# Patient Record
Sex: FEMALE | Race: WHITE
[De-identification: names, ages, dates, MRNs, and addresses within clinical notes are randomized per-mention and may not be internally consistent; named-entity substitution may affect disease eponyms.]

---

## 2017-12-15 ENCOUNTER — HOSPITAL ENCOUNTER (OUTPATIENT)
Dept: HOSPITAL 61 - KCIC | Age: 57
Discharge: HOME | End: 2017-12-15
Attending: ORTHOPAEDIC SURGERY
Payer: COMMERCIAL

## 2017-12-15 DIAGNOSIS — M75.101: Primary | ICD-10-CM

## 2017-12-15 PROCEDURE — A9585 GADOBUTROL INJECTION: HCPCS

## 2017-12-15 PROCEDURE — 73040 CONTRAST X-RAY OF SHOULDER: CPT

## 2017-12-15 PROCEDURE — 73222 MRI JOINT UPR EXTREM W/DYE: CPT

## 2017-12-15 NOTE — [facility]
PROCEDURE: 

Right shoulder injection using fluoroscopic guidance, prior to MR.

 

HISTORY: 

Shoulder pain. Prior rotator cuff repair.

 

TECHNIQUE: 

The procedure was explained to the patient as were potential risks, 

including among others infection, bleeding or allergic reaction. All 

questions were answered. Informed written and verbal consent was obtained.

The shoulder was prepped and draped in the usual sterile manner. Following

administration of local anesthetic, a 22-gauge needle was advanced into 

the anterior shoulder. Following negative aspiration, 12 cc of a solution 

of 5cc Omnipaque-300 contrast, 5 cc 1% lidocaine, 10 cc normal saline, and

0.1 cc gadolinium was injected without difficulty. The needle was removed.

There was good hemostasis at the injection site. The shoulder was briefly 

exercised. The patient left in stable condition without immediate 

complication.

 

2 spot images are obtained.

 

There is some accumulation of contrast in the substance of the 

supraspinatus tendon. Further evaluation will be obtained on the MR exam.

 

FLUOROSCOPY TIME:?53 seconds

 

Electronically signed by: Remi Foss MD (12/15/2017 5:07 PM) [id number]

## 2017-12-15 NOTE — [facility]
MR arthrogram of the right shoulder

 

Indication: Right shoulder pain. Prior rotator cuff repair.

 

Technique:  Intra-articular contrast injected into the glenohumeral joint 

and is reported separately. Routine 4 plane sequences were obtained, 

including ABER positioning.

 

COMPARISON: Not currently available.

 

 

Findings:

 

Acromioclavicular joint: Mildly degenerative. No significant undersurface 

mass effect.

 

Rotator cuff: Prior surgical reattachment. Severe thinning of the 

supraspinatus tendon, measuring as little as 1 mm. There is retraction of 

undersurface fibers by 2.0 cm. There is a tiny area at the critical zone 

of the supraspinatus where there appears to be a 1 mm pinpoint through and

through defect with minimal spill of contrast into the adjacent subdeltoid

bursa. Coronal series 6, image 14. Infraspinatus tendon intact. 

Subscapularis tendon intact. Mild rotator cuff muscle atrophy.

 

Articular cartilage: No acute cartilage defect or advanced DJD.

 

Labrum: Mild heterogeneity of labral signal, compatible with degeneration.

No clear cut labral detachment.

 

Biceps tendon: Intact

 

Bones: No lesion or acute fracture.

 

Soft tissue: No acute findings.

 

Impression: Severe thinning of the supraspinatus tendon, compatible with 

deep undersurface tearing with undersurface fiber retraction. There is 

evidence of a tiny pinpoint full-thickness tear through the bursal layer, 

measuring about 1 mm.

 

Electronically signed by: Remi Foss MD (12/15/2017 5:03 PM) [facility]

## 2020-10-22 ENCOUNTER — HOSPITAL ENCOUNTER (OUTPATIENT)
Dept: HOSPITAL 61 - LAB | Age: 60
End: 2020-10-22
Attending: INTERNAL MEDICINE
Payer: COMMERCIAL

## 2020-10-22 DIAGNOSIS — R51.9: Primary | ICD-10-CM

## 2020-10-22 DIAGNOSIS — R50.9: ICD-10-CM

## 2020-10-22 DIAGNOSIS — M79.10: ICD-10-CM

## 2020-10-22 DIAGNOSIS — Z20.828: ICD-10-CM

## 2021-08-30 ENCOUNTER — HOSPITAL ENCOUNTER (OUTPATIENT)
Dept: HOSPITAL 61 - LAB | Age: 61
End: 2021-08-30
Attending: INTERNAL MEDICINE
Payer: COMMERCIAL

## 2021-08-30 DIAGNOSIS — Z20.822: Primary | ICD-10-CM

## 2021-08-30 PROCEDURE — U0003 INFECTIOUS AGENT DETECTION BY NUCLEIC ACID (DNA OR RNA); SEVERE ACUTE RESPIRATORY SYNDROME CORONAVIRUS 2 (SARS-COV-2) (CORONAVIRUS DISEASE [COVID-19]), AMPLIFIED PROBE TECHNIQUE, MAKING USE OF HIGH THROUGHPUT TECHNOLOGIES AS DESCRIBED BY CMS-2020-01-R: HCPCS

## 2021-09-03 ENCOUNTER — HOSPITAL ENCOUNTER (OUTPATIENT)
Dept: HOSPITAL 61 - LAB | Age: 61
End: 2021-09-03
Attending: INTERNAL MEDICINE
Payer: COMMERCIAL

## 2021-09-03 DIAGNOSIS — Z20.822: Primary | ICD-10-CM

## 2021-09-03 PROCEDURE — U0003 INFECTIOUS AGENT DETECTION BY NUCLEIC ACID (DNA OR RNA); SEVERE ACUTE RESPIRATORY SYNDROME CORONAVIRUS 2 (SARS-COV-2) (CORONAVIRUS DISEASE [COVID-19]), AMPLIFIED PROBE TECHNIQUE, MAKING USE OF HIGH THROUGHPUT TECHNOLOGIES AS DESCRIBED BY CMS-2020-01-R: HCPCS

## 2021-10-08 ENCOUNTER — HOSPITAL ENCOUNTER (OUTPATIENT)
Dept: HOSPITAL 61 - LAB | Age: 61
End: 2021-10-08
Attending: INTERNAL MEDICINE
Payer: COMMERCIAL

## 2021-10-08 DIAGNOSIS — Z20.822: Primary | ICD-10-CM

## 2021-10-08 PROCEDURE — U0003 INFECTIOUS AGENT DETECTION BY NUCLEIC ACID (DNA OR RNA); SEVERE ACUTE RESPIRATORY SYNDROME CORONAVIRUS 2 (SARS-COV-2) (CORONAVIRUS DISEASE [COVID-19]), AMPLIFIED PROBE TECHNIQUE, MAKING USE OF HIGH THROUGHPUT TECHNOLOGIES AS DESCRIBED BY CMS-2020-01-R: HCPCS

## 2021-10-11 ENCOUNTER — HOSPITAL ENCOUNTER (OUTPATIENT)
Dept: HOSPITAL 61 - LAB | Age: 61
End: 2021-10-11
Attending: INTERNAL MEDICINE
Payer: COMMERCIAL

## 2021-10-11 DIAGNOSIS — Z20.822: Primary | ICD-10-CM

## 2021-10-11 PROCEDURE — U0003 INFECTIOUS AGENT DETECTION BY NUCLEIC ACID (DNA OR RNA); SEVERE ACUTE RESPIRATORY SYNDROME CORONAVIRUS 2 (SARS-COV-2) (CORONAVIRUS DISEASE [COVID-19]), AMPLIFIED PROBE TECHNIQUE, MAKING USE OF HIGH THROUGHPUT TECHNOLOGIES AS DESCRIBED BY CMS-2020-01-R: HCPCS
